# Patient Record
Sex: MALE | URBAN - METROPOLITAN AREA
[De-identification: names, ages, dates, MRNs, and addresses within clinical notes are randomized per-mention and may not be internally consistent; named-entity substitution may affect disease eponyms.]

---

## 2022-02-27 ENCOUNTER — HOSPITAL ENCOUNTER (OUTPATIENT)
Dept: MEDSURG UNIT | Facility: HOSPITAL | Age: 52
End: 2022-02-28
Attending: SURGERY | Admitting: SURGERY

## 2022-02-27 ENCOUNTER — HISTORICAL (OUTPATIENT)
Dept: ADMINISTRATIVE | Facility: HOSPITAL | Age: 52
End: 2022-02-27

## 2022-05-14 NOTE — OP NOTE
DATE OF SURGERY:        SURGEON:  Karthik Gonzalez M.D.    ADMITTING DIAGNOSIS:  Acute appendicitis.    PROCEDURE:  Laparoscopic appendectomy.    POSTOPERATIVE DIAGNOSES:  Acute appendicitis, nonperforated.    BRIEF HISTORY:  The patient is a 51-year-old [place] male with a history of midepigastric to supraumbilical abdominal pain beginning at midnight, progressing to 5 o'clock this morning, came to the emergency room in [place], had a CT that suggested acute appendicitis, and was referred for appendectomy.    DESCRIPTION OF PROCEDURE:  Patient brought to the operating room.  Supine position.  General anesthetic.  Prepped and draped in a sterile fashion.  Had a supraumbilical incision made with insertion of Aryan needle, insufflation of abdomen of 14 mmHg with insertion of a 5 mm trocar.  Patient then underwent insertion of a 5 mm trocar in the right upper quadrant and another 5 mm trocar in left lower quadrant.  Grasping and mobilization of the appendix.  Mesoappendix was dissected.  Hemoclips were placed.  The base of the appendix was resected with an Endoloop and Endo Ha.  The patient had good hemostasis.  Upon completion, the appendix was placed in an Endobag and brought through the right upper quadrant trocar site.  The aponeuroses of the 5 mm trocar sites were closed with 0 Vicryl on a  needle.  Subcu was closed with 4-0 plain gut suture.  Dermabond was used for the skin.  Sterile dressings were     applied.  No problems were encountered.  Patient tolerated the procedure well.  I appreciate the consultation referral from Dr. Diehl and will notify him of my findings.      GONZALEZ/LAKISHA   DD: 02/27/2022 1259   DT: 02/27/2022 1315  Job # [id number]    cc: Dr. Diehl

## 2022-05-14 NOTE — ED PROVIDER NOTES
Patient:   Arturo Taylor             MRN: [id number]            FIN: [id number]               Age:   51 years     Sex:  Male     :  1970   Associated Diagnoses:   Acute appendicitis   Author:   Lianna Diehl MD      Basic Information   Time seen: Date & time 2022 10:09:00.   History source: Patient, EMS.   Arrival mode: Ambulance.   History limitation: None.   Additional information: Chief Complaint from Nursing Triage Note : Chief Complaint   2022 10:10 CST      Chief Complaint           Brought in per [facility] from [facility] for appendicitis.    2022 5:25 CST       Chief Complaint           pt c/o mid lower abdominal pain that radiates up to the top of his abdomen x 5.5 hours; nausea and vomiting x 2 and diarrhea x 1 episode; denies any cp or SOB  .   Provider/Visit info:   Time Seen:  Lianna Diehl MD / 2022 10:09  .   History of Present Illness   The patient presents with abdominal pain.  The onset was since last night.  The course/duration of symptoms is constant.  The character of symptoms is achy.  The degree at onset was moderate.  The Location of pain at onset was right, lower and abdominal.  The degree at present is moderate.  The Location of pain at present is lower and abdominal.  Radiating pain: none. The exacerbating factor is none.  The relieving factor is none.  Therapy today: none.  Risk factors consist of none.  Associated symptoms: nausea.  Patient was started having abdominal pain last night, had some vomiting, was seen at [facility] where he was diagnosed with acute appendicitis and is sent to us for surgery..        Review of Systems   Constitutional symptoms:  No fever, no chills, no sweats.    Skin symptoms:  No jaundice, no rash.    Eye symptoms:  Negative except as documented in HPI.   ENMT symptoms:  No sore throat,    Respiratory symptoms:  No shortness of breath, no cough, no wheezing.    Cardiovascular symptoms:  No chest pain, no  palpitations, no tachycardia.    Gastrointestinal symptoms:  Abdominal pain, moderate, right lower quadrant, no nausea, no vomiting, no diarrhea, no constipation.    Genitourinary symptoms:  No dysuria,    Musculoskeletal symptoms:  No back pain,    Neurologic symptoms:  No headache, no dizziness.    Psychiatric symptoms:  No anxiety, no depression, no substance abuse.    Allergy/immunologic symptoms:  No seasonal allergies,              Additional review of systems information: All other systems reviewed and otherwise negative.      Health Status   Allergies:    Allergic Reactions (Selected)  No Known Allergies,    Allergies (1) Active Reaction  No Known Allergies None Documented  .   Medications:  (Selected)   Inpatient Medications  Ordered  Normal Saline (0.9% NS) IV 1,000 mL: 1,000 mL, 1,000 mL, IV, Once, 1,000 mL/hr, start date 02/27/22 5:40:00 CST, per nurse's notes.      Past Medical/ Family/ Social History   Medical history:    No active or resolved past medical history items have been selected or recorded., Reviewed as documented in chart.   Surgical history:    Cholecystectomy ([id number]) in 2013 at 43 Years., Reviewed as documented in chart.   Family history:    Father  Unknown cause of morbidity or mortality.....  Mother  Unknown cause of morbidity or mortality.....  , Reviewed as documented in chart.   Social history:    Social & Psychosocial Habits    Tobacco  02/27/2022  Use: 5-9 cigarettes (between 1    Patient Wants Consult For Cessation Counseling No    Abuse/Neglect  02/27/2022  [facility] Any signs of abuse or neglect No  , Reviewed as documented in chart.   Problem list:    Active Problems (1)  Tobacco user   , per nurse's notes.      Physical Examination               Vital Signs   Measurements   2/27/2022 10:10 CST      Weight Dosing             80 kg                             Weight Measured and Calculated in Lbs     176.37 lb                             Weight Estimated          80 kg                              Height/Length Dosing      170 cm                             Height/Length Estimated   170 cm                             Body Mass Index Estimated 27.68 kg/m2  .   Basic Oxygen Information   2/27/2022 9:01 CST       SpO2                      98 %                             Oxygen Therapy            Room air    2/27/2022 8:29 CST       SpO2                      97 %                             Oxygen Therapy            Room air    2/27/2022 7:02 CST       SpO2                      96 %                             Oxygen Therapy            Room air    2/27/2022 6:43 CST       SpO2                      94 %                             Oxygen Therapy            Room air    2/27/2022 5:25 CST       SpO2                      97 %                             Oxygen Therapy            Room air  .   General:  Alert, no acute distress.    Skin:  Warm, dry.    Head:  Normocephalic.   Eye:  Extraocular movements are intact.   Cardiovascular:  Regular rate and rhythm.   Respiratory:  Lungs are clear to auscultation.   Back:  Normal range of motion.   Musculoskeletal:  Normal ROM.   Chest wall   Gastrointestinal:  Soft, Tenderness: Mild, epigastric, right upper quadrant, Bowel sounds: Normal.    Neurological:  Alert and oriented to person, place, time, and situation, normal motor observed, normal speech observed.    Lymphatics   Psychiatric:  Cooperative, appropriate mood & affect.       Medical Decision Making   Documents reviewed:  Emergency department nurses' notes.   Results review:     No qualifying data available.      Impression and Plan   Diagnosis   Acute appendicitis ([id number] K35.80)      Calls-Consults   -  2/27/2022 10:31:00 , Carlos OSBORNE, [facility], recommends OK to admit.    Plan   Disposition: Admit time  2/27/2022 10:32:00, Admit to Inpatient Unit.    Counseled: Patient.    Orders: Launch Orders   Admit/Transfer/Discharge:  Admit as Inpatient (Order): 2/27/2022 10:32 CST, Medical Unit Carlos  MD, Karthik, No, Acute Appendicitis.